# Patient Record
Sex: MALE | Race: WHITE | NOT HISPANIC OR LATINO | URBAN - METROPOLITAN AREA
[De-identification: names, ages, dates, MRNs, and addresses within clinical notes are randomized per-mention and may not be internally consistent; named-entity substitution may affect disease eponyms.]

---

## 2021-04-20 ENCOUNTER — APPOINTMENT (RX ONLY)
Dept: URBAN - METROPOLITAN AREA CLINIC 12 | Facility: CLINIC | Age: 66
Setting detail: DERMATOLOGY
End: 2021-04-20

## 2021-04-20 DIAGNOSIS — L50.8 OTHER URTICARIA: ICD-10-CM | Status: WORSENING

## 2021-04-20 PROCEDURE — ? TREATMENT REGIMEN

## 2021-04-20 PROCEDURE — 99203 OFFICE O/P NEW LOW 30 MIN: CPT

## 2021-04-20 PROCEDURE — ? PRESCRIPTION

## 2021-04-20 PROCEDURE — ? COUNSELING: PREDNISONE

## 2021-04-20 PROCEDURE — ? COUNSELING

## 2021-04-20 PROCEDURE — ? OTHER

## 2021-04-20 RX ORDER — PREDNISONE 20 MG/1
TABLET ORAL
Qty: 42 | Refills: 0 | Status: ERX | COMMUNITY
Start: 2021-04-20

## 2021-04-20 RX ADMIN — PREDNISONE: 20 TABLET ORAL at 00:00

## 2021-04-20 NOTE — PROCEDURE: OTHER
Note Text (......Xxx Chief Complaint.): This diagnosis correlates with the
Other (Free Text): - onset 4/6/21 (got Pfizer covid shot the day after onset).\\n- itchy, denies bruises. \\n- fleeting within 24 hours.\\n- Got IM Kenalog 4/11/21 and also started oral prednisone 5 mg tablets: 6 x 2d, 5x 2 d then has varied the cycle depending on how bad the rash is. \\nDenies new medications, foods, products
Render Risk Assessment In Note?: no
Detail Level: Zone

## 2021-04-20 NOTE — PROCEDURE: TREATMENT REGIMEN
Otc Regimen: Allegra (fexofenadine) 180 mg tablet:  take 2 tablets every morning.\\nBenadryl 25 mg: take 2 tablets before bed. \\nCeraVe Anti-Itch cream\\n\\nSTAY ON OVER THE COUNTER ANTIHISTAMINES EVEN AFTER YOU COMPLETE THE PREDNISONE TAPER.
Detail Level: Zone

## 2021-04-20 NOTE — HPI: RASH
What Type Of Note Output Would You Prefer (Optional)?: Bullet Format
Is The Patient Presenting As Previously Scheduled?: Yes
How Severe Is Your Rash?: mild
Is This A New Presentation, Or A Follow-Up?: Rash
Additional History: Declines fbe.

## 2025-05-20 ENCOUNTER — APPOINTMENT (OUTPATIENT)
Dept: URBAN - METROPOLITAN AREA CLINIC 195 | Age: 70
Setting detail: DERMATOLOGY
End: 2025-05-22

## 2025-05-20 VITALS — DIASTOLIC BLOOD PRESSURE: 76 MMHG | SYSTOLIC BLOOD PRESSURE: 130 MMHG

## 2025-05-20 DIAGNOSIS — Z12.83 ENCOUNTER FOR SCREENING FOR MALIGNANT NEOPLASM OF SKIN: ICD-10-CM

## 2025-05-20 DIAGNOSIS — Z86.006 PERSONAL HISTORY OF MELANOMA IN-SITU: ICD-10-CM

## 2025-05-20 DIAGNOSIS — L57.8 OTHER SKIN CHANGES DUE TO CHRONIC EXPOSURE TO NONIONIZING RADIATION: ICD-10-CM

## 2025-05-20 PROBLEM — D03.61 MELANOMA IN SITU OF RIGHT UPPER LIMB, INCLUDING SHOULDER: Status: ACTIVE | Noted: 2025-05-20

## 2025-05-20 PROCEDURE — OTHER MIPS QUALITY: OTHER

## 2025-05-20 PROCEDURE — OTHER SURGICAL DECISION MAKING: OTHER

## 2025-05-20 PROCEDURE — OTHER COUNSELING: OTHER

## 2025-05-20 PROCEDURE — OTHER VISIT COMPLEXITY: OTHER

## 2025-05-20 PROCEDURE — OTHER MELANOMA STAGING: OTHER

## 2025-05-20 PROCEDURE — OTHER SEPARATE AND IDENTIFIABLE DOCUMENTATION: OTHER

## 2025-05-20 PROCEDURE — OTHER STAGED MICROSCOPICALLY CONTROLLED EXCISION WITH COMPLETE CIRCUMFERENTIAL, PERIPHERAL AND DEEP MARGIN ASSESSMENT USING FROZEN SECTION: OTHER

## 2025-05-20 PROCEDURE — OTHER ASC CONSULTATION: OTHER

## 2025-05-20 PROCEDURE — OTHER ADDITIONAL NOTES: OTHER
